# Patient Record
Sex: MALE
[De-identification: names, ages, dates, MRNs, and addresses within clinical notes are randomized per-mention and may not be internally consistent; named-entity substitution may affect disease eponyms.]

---

## 2019-06-14 VITALS — SYSTOLIC BLOOD PRESSURE: 137 MMHG | DIASTOLIC BLOOD PRESSURE: 67 MMHG

## 2019-06-14 LAB
APTT PPP: 28.2 SEC (ref 26.3–35.5)
BASOPHILS NFR BLD AUTO: 1.8 % (ref 0–5)
BUN SERPL-MCNC: 23 MG/DL (ref 7–18)
CHLORIDE SERPL-SCNC: 100 MMOL/L (ref 101–111)
CO2 SERPL-SCNC: 28 MMOL/L (ref 21–32)
CREAT SERPL-MCNC: 1.5 MG/DL (ref 0.5–1.5)
EOSINOPHIL NFR BLD AUTO: 8 % (ref 0–8)
ERYTHROCYTE [DISTWIDTH] IN BLOOD BY AUTOMATED COUNT: 13.6 % (ref 11–15.5)
GFR SERPL CREATININE-BSD FRML MDRD: 49 ML/MIN (ref 60–?)
GLUCOSE SERPL-MCNC: 209 MG/DL (ref 70–105)
HCT VFR BLD AUTO: 39.6 % (ref 42–54)
INR PPP: 1.03 (ref 0.85–1.15)
LYMPHOCYTES NFR SPEC AUTO: 27.1 % (ref 21–51)
MCH RBC QN AUTO: 30.6 PG (ref 27–33)
MCHC RBC AUTO-ENTMCNC: 34.7 G/DL (ref 32–36)
MCV RBC AUTO: 88.1 FL (ref 79–99)
MONOCYTES NFR BLD AUTO: 8.1 % (ref 3–13)
NEUTROPHILS NFR BLD AUTO: 55 % (ref 40–77)
NRBC BLD MANUAL-RTO: 0.1 % (ref 0–0.19)
PH UR STRIP: 5 [PH] (ref 5–8)
PLATELET # BLD AUTO: 221 K/UL (ref 130–400)
POTASSIUM SERPL-SCNC: 5.3 MMOL/L (ref 3.5–5.1)
PROTHROMBIN TIME: 10.8 SEC (ref 9.6–11.6)
RBC # BLD AUTO: 4.5 MIL/UL (ref 4.5–6.2)
SODIUM SERPL-SCNC: 136 MMOL/L (ref 136–145)
SP GR UR STRIP: 1.01 (ref 1–1.03)
UROBILINOGEN UR STRIP-MCNC: 0.2 MG/DL (ref 0.2–1)
WBC # BLD AUTO: 6.6 K/UL (ref 4.8–10.8)

## 2019-06-17 ENCOUNTER — HOSPITAL ENCOUNTER (OUTPATIENT)
Dept: HOSPITAL 90 - DAH | Age: 74
Discharge: HOME | End: 2019-06-17
Attending: INTERNAL MEDICINE
Payer: COMMERCIAL

## 2019-06-17 VITALS — DIASTOLIC BLOOD PRESSURE: 75 MMHG | SYSTOLIC BLOOD PRESSURE: 137 MMHG

## 2019-06-17 VITALS — SYSTOLIC BLOOD PRESSURE: 146 MMHG | DIASTOLIC BLOOD PRESSURE: 67 MMHG

## 2019-06-17 VITALS — SYSTOLIC BLOOD PRESSURE: 145 MMHG | DIASTOLIC BLOOD PRESSURE: 67 MMHG

## 2019-06-17 VITALS — SYSTOLIC BLOOD PRESSURE: 132 MMHG | DIASTOLIC BLOOD PRESSURE: 63 MMHG

## 2019-06-17 VITALS — BODY MASS INDEX: 25.76 KG/M2 | WEIGHT: 154.6 LBS | HEIGHT: 65 IN

## 2019-06-17 VITALS — SYSTOLIC BLOOD PRESSURE: 121 MMHG | DIASTOLIC BLOOD PRESSURE: 76 MMHG

## 2019-06-17 VITALS — SYSTOLIC BLOOD PRESSURE: 139 MMHG | DIASTOLIC BLOOD PRESSURE: 70 MMHG

## 2019-06-17 VITALS — DIASTOLIC BLOOD PRESSURE: 66 MMHG | SYSTOLIC BLOOD PRESSURE: 125 MMHG

## 2019-06-17 VITALS — DIASTOLIC BLOOD PRESSURE: 73 MMHG | SYSTOLIC BLOOD PRESSURE: 133 MMHG

## 2019-06-17 VITALS — SYSTOLIC BLOOD PRESSURE: 110 MMHG | DIASTOLIC BLOOD PRESSURE: 77 MMHG

## 2019-06-17 VITALS — DIASTOLIC BLOOD PRESSURE: 63 MMHG | SYSTOLIC BLOOD PRESSURE: 123 MMHG

## 2019-06-17 VITALS — DIASTOLIC BLOOD PRESSURE: 57 MMHG | SYSTOLIC BLOOD PRESSURE: 125 MMHG

## 2019-06-17 DIAGNOSIS — E11.21: ICD-10-CM

## 2019-06-17 DIAGNOSIS — N18.9: ICD-10-CM

## 2019-06-17 DIAGNOSIS — I25.2: ICD-10-CM

## 2019-06-17 DIAGNOSIS — Z79.4: ICD-10-CM

## 2019-06-17 DIAGNOSIS — I25.10: Primary | ICD-10-CM

## 2019-06-17 DIAGNOSIS — R00.2: ICD-10-CM

## 2019-06-17 DIAGNOSIS — Z87.891: ICD-10-CM

## 2019-06-17 DIAGNOSIS — I13.0: ICD-10-CM

## 2019-06-17 DIAGNOSIS — Z79.84: ICD-10-CM

## 2019-06-17 DIAGNOSIS — Z82.49: ICD-10-CM

## 2019-06-17 DIAGNOSIS — I50.22: ICD-10-CM

## 2019-06-17 DIAGNOSIS — Z83.3: ICD-10-CM

## 2019-06-17 DIAGNOSIS — E11.22: ICD-10-CM

## 2019-06-17 DIAGNOSIS — Z98.890: ICD-10-CM

## 2019-06-17 DIAGNOSIS — Z79.899: ICD-10-CM

## 2019-06-17 LAB
BUN SERPL-MCNC: 22 MG/DL (ref 7–18)
CHLORIDE SERPL-SCNC: 101 MMOL/L (ref 101–111)
CO2 SERPL-SCNC: 27 MMOL/L (ref 21–32)
CREAT SERPL-MCNC: 1.5 MG/DL (ref 0.5–1.5)
GFR SERPL CREATININE-BSD FRML MDRD: 49 ML/MIN (ref 60–?)
GLUCOSE SERPL-MCNC: 122 MG/DL (ref 70–105)
POTASSIUM SERPL-SCNC: 4.4 MMOL/L (ref 3.5–5.1)
SODIUM SERPL-SCNC: 136 MMOL/L (ref 136–145)

## 2019-06-17 PROCEDURE — 93458 L HRT ARTERY/VENTRICLE ANGIO: CPT

## 2019-06-17 PROCEDURE — 81003 URINALYSIS AUTO W/O SCOPE: CPT

## 2019-06-17 PROCEDURE — 99157 MOD SED OTHER PHYS/QHP EA: CPT

## 2019-06-17 PROCEDURE — 82948 REAGENT STRIP/BLOOD GLUCOSE: CPT

## 2019-06-17 PROCEDURE — 36415 COLL VENOUS BLD VENIPUNCTURE: CPT

## 2019-06-17 PROCEDURE — 71045 X-RAY EXAM CHEST 1 VIEW: CPT

## 2019-06-17 PROCEDURE — 93005 ELECTROCARDIOGRAM TRACING: CPT

## 2019-06-17 PROCEDURE — 85025 COMPLETE CBC W/AUTO DIFF WBC: CPT

## 2019-06-17 PROCEDURE — 85730 THROMBOPLASTIN TIME PARTIAL: CPT

## 2019-06-17 PROCEDURE — 85610 PROTHROMBIN TIME: CPT

## 2019-06-17 PROCEDURE — 80048 BASIC METABOLIC PNL TOTAL CA: CPT

## 2019-06-17 PROCEDURE — 99156 MOD SED OTH PHYS/QHP 5/>YRS: CPT

## 2019-06-17 NOTE — NUR
DISCHARGE



DAY PT DISCHARGE INSTRUCTION SHEET, MED REC, AND PT EDUCATION REVIEWED WITH PT AND 
DAUGHTERS. EDUCATED ON CHECKING CATH SITE FOR SIGNS OF BLEEDING INCLUDING EXCESSIVE 
SWELLING/PAIN, CHANGE IN COLOR TO AFFECTED LEG. INSTRUCTED TO APPLY DIRECT PRESSURE IF 
PUNCTURE SITE STARTS BLEEDING AND CALL 911. PT AND FAMILY VERBALIZED UNDERSTANDING.

## 2019-06-17 NOTE — NUR
MD ROUNDS



DR. العلي IN TO SEE PATIENT. FINDINGS AND PLAN DISCUSSED WITH PT AND FAMILY. PT STATING "I 
DONT WANT SURGERY". PER DR. العلي NOTIFY ALONZO BRICE PT NOT IN AGREEMENT WITH SURGERY.

## 2019-06-17 NOTE — NUR
ACTIVITY



HOB ELEVATED TO 25 DEGREES. RIGHT FEMORAL CATH SITE W/O SIGNS OF BLEEDING. DRESSING CLEAN, 
DRY, AND INTACT. SITE SOFT, NON-TENDER.

## 2019-06-17 NOTE — NUR
REMOVED 1 ML  OF AIR REMOVED FROM RIGHT RADIAL BAND. NO SIGNS OF BLEEDING OR HEMATOMA NOTED. 
PATIENT STATES FEELING FINE.

## 2019-06-17 NOTE — NUR
PRE-PROCEDURE



RECEIVED TO DAY 15 FOR SCHEDULED HEAR CATHETERIZATION RADIAL APPROACH VIA WALKING 
ACCOMPANIED BY DAUGHTER. AWAKE IN NO ACUTE DISTRESS. CONNECTED TO CONTINUOUS CARDIOPULMONARY 
MONITORING. BED IN LOWEST POSITION, CALL LIGHT WITHIN REACH, SIDE RAILS UP X2.

## 2019-06-17 NOTE — NUR
REMOVED 2ML  OF AIR FROM RIGHT RADIAL BAND. NO SIGNS OF BLEEDING OR HEMATOMA NOTED. PATIENT 
STATES FEELING FINE.

## 2019-06-17 NOTE — NUR
ACTIVITY



UP TO EDGE OF BED WITH MINIMAL ASSIST X1. TOLERATED W/O COMPLAINTS OF DIZZINESS/PAIN.  RIGHT 
FEMORAL CATH SITE W/O SIGNS OF BLEEDING. DRESSING CD&I, SITE SOFT, NON-TENDER. RIGHT RADIAL 
BAND IN PLACE; SITE W/O SIGNS OF BLEEDING.

## 2020-12-11 ENCOUNTER — HOSPITAL ENCOUNTER (INPATIENT)
Dept: HOSPITAL 90 - EDH | Age: 75
LOS: 1 days | DRG: 177 | End: 2020-12-12
Attending: INTERNAL MEDICINE | Admitting: INTERNAL MEDICINE
Payer: COMMERCIAL

## 2020-12-11 VITALS — WEIGHT: 134.99 LBS | HEIGHT: 65 IN | BODY MASS INDEX: 22.49 KG/M2

## 2020-12-11 DIAGNOSIS — Z79.4: ICD-10-CM

## 2020-12-11 DIAGNOSIS — I50.22: ICD-10-CM

## 2020-12-11 DIAGNOSIS — U07.1: Primary | ICD-10-CM

## 2020-12-11 DIAGNOSIS — E78.5: ICD-10-CM

## 2020-12-11 DIAGNOSIS — Z95.1: ICD-10-CM

## 2020-12-11 DIAGNOSIS — E11.22: ICD-10-CM

## 2020-12-11 DIAGNOSIS — J12.89: ICD-10-CM

## 2020-12-11 DIAGNOSIS — I13.0: ICD-10-CM

## 2020-12-11 DIAGNOSIS — J96.01: ICD-10-CM

## 2020-12-11 DIAGNOSIS — I21.4: ICD-10-CM

## 2020-12-11 DIAGNOSIS — I46.9: ICD-10-CM

## 2020-12-11 DIAGNOSIS — N40.0: ICD-10-CM

## 2020-12-11 DIAGNOSIS — E87.1: ICD-10-CM

## 2020-12-11 DIAGNOSIS — I25.10: ICD-10-CM

## 2020-12-11 DIAGNOSIS — N18.30: ICD-10-CM

## 2020-12-11 DIAGNOSIS — I47.1: ICD-10-CM

## 2020-12-11 DIAGNOSIS — Z79.02: ICD-10-CM

## 2020-12-11 DIAGNOSIS — E11.42: ICD-10-CM

## 2020-12-11 DIAGNOSIS — Z88.8: ICD-10-CM

## 2020-12-11 LAB
ALBUMIN SERPL-MCNC: 3.1 G/DL (ref 3.5–5)
ALT SERPL-CCNC: 34 U/L (ref 12–78)
APTT PPP: 26.7 SEC (ref 26.3–35.5)
AST SERPL-CCNC: 111 U/L (ref 10–37)
BASOPHILS NFR BLD AUTO: 0.1 % (ref 0–5)
BILIRUB SERPL-MCNC: 0.4 MG/DL (ref 0.2–1)
BUN SERPL-MCNC: 32 MG/DL (ref 7–18)
CHLORIDE SERPL-SCNC: 97 MMOL/L (ref 101–111)
CK SERPL-CCNC: 1061 U/L (ref 21–232)
CO2 SERPL-SCNC: 21 MMOL/L (ref 21–32)
CREAT SERPL-MCNC: 1.8 MG/DL (ref 0.5–1.5)
EOSINOPHIL NFR BLD AUTO: 0 % (ref 0–8)
ERYTHROCYTE [DISTWIDTH] IN BLOOD BY AUTOMATED COUNT: 12.7 % (ref 11–15.5)
GFR SERPL CREATININE-BSD FRML MDRD: 39 ML/MIN (ref 60–?)
GLUCOSE SERPL-MCNC: 235 MG/DL (ref 70–105)
GLUCOSE UR STRIP-MCNC: 500 MG/DL
HCT VFR BLD AUTO: 34.4 % (ref 42–54)
INR PPP: 1.05 (ref 0.85–1.15)
LYMPHOCYTES NFR SPEC AUTO: 5.6 % (ref 21–51)
MCH RBC QN AUTO: 29.7 PG (ref 27–33)
MCHC RBC AUTO-ENTMCNC: 34 G/DL (ref 32–36)
MCV RBC AUTO: 87.3 FL (ref 79–99)
MONOCYTES NFR BLD AUTO: 6.1 % (ref 3–13)
MYOGLOBIN SERPL-MCNC: 971 NG/ML (ref 10–92)
NEUTROPHILS NFR BLD AUTO: 88 % (ref 40–77)
NRBC BLD MANUAL-RTO: 0 % (ref 0–0.19)
PH UR STRIP: 5 [PH] (ref 5–8)
PLATELET # BLD AUTO: 166 K/UL (ref 130–400)
POTASSIUM SERPL-SCNC: 4.2 MMOL/L (ref 3.5–5.1)
PROT SERPL-MCNC: 6.7 G/DL (ref 6–8.3)
PROT UR QL STRIP: (no result) MG/DL
PROTHROMBIN TIME: 11.3 SEC (ref 9.6–11.6)
RBC # BLD AUTO: 3.94 MIL/UL (ref 4.5–6.2)
RBC #/AREA URNS HPF: (no result) /HPF (ref 0–1)
SODIUM SERPL-SCNC: 129 MMOL/L (ref 136–145)
SP GR UR STRIP: 1.03 (ref 1–1.03)
TROPONIN I SERPL-MCNC: 19.35 NG/ML (ref 0–0.06)
UROBILINOGEN UR STRIP-MCNC: 1 MG/DL (ref 0.2–1)
WBC # BLD AUTO: 8.2 K/UL (ref 4.8–10.8)
WBC #/AREA URNS HPF: (no result) /HPF (ref 0–1)

## 2020-12-11 PROCEDURE — 83874 ASSAY OF MYOGLOBIN: CPT

## 2020-12-11 PROCEDURE — 80048 BASIC METABOLIC PNL TOTAL CA: CPT

## 2020-12-11 PROCEDURE — 84145 PROCALCITONIN (PCT): CPT

## 2020-12-11 PROCEDURE — 83735 ASSAY OF MAGNESIUM: CPT

## 2020-12-11 PROCEDURE — 85025 COMPLETE CBC W/AUTO DIFF WBC: CPT

## 2020-12-11 PROCEDURE — 82728 ASSAY OF FERRITIN: CPT

## 2020-12-11 PROCEDURE — 84100 ASSAY OF PHOSPHORUS: CPT

## 2020-12-11 PROCEDURE — 93970 EXTREMITY STUDY: CPT

## 2020-12-11 PROCEDURE — 83605 ASSAY OF LACTIC ACID: CPT

## 2020-12-11 PROCEDURE — 81001 URINALYSIS AUTO W/SCOPE: CPT

## 2020-12-11 PROCEDURE — 86901 BLOOD TYPING SEROLOGIC RH(D): CPT

## 2020-12-11 PROCEDURE — 85610 PROTHROMBIN TIME: CPT

## 2020-12-11 PROCEDURE — 80053 COMPREHEN METABOLIC PANEL: CPT

## 2020-12-11 PROCEDURE — 82803 BLOOD GASES ANY COMBINATION: CPT

## 2020-12-11 PROCEDURE — 92950 HEART/LUNG RESUSCITATION CPR: CPT

## 2020-12-11 PROCEDURE — 71045 X-RAY EXAM CHEST 1 VIEW: CPT

## 2020-12-11 PROCEDURE — 83615 LACTATE (LD) (LDH) ENZYME: CPT

## 2020-12-11 PROCEDURE — 86850 RBC ANTIBODY SCREEN: CPT

## 2020-12-11 PROCEDURE — 36415 COLL VENOUS BLD VENIPUNCTURE: CPT

## 2020-12-11 PROCEDURE — 80076 HEPATIC FUNCTION PANEL: CPT

## 2020-12-11 PROCEDURE — 84484 ASSAY OF TROPONIN QUANT: CPT

## 2020-12-11 PROCEDURE — 86900 BLOOD TYPING SEROLOGIC ABO: CPT

## 2020-12-11 PROCEDURE — 36600 WITHDRAWAL OF ARTERIAL BLOOD: CPT

## 2020-12-11 PROCEDURE — 82550 ASSAY OF CK (CPK): CPT

## 2020-12-11 PROCEDURE — 86140 C-REACTIVE PROTEIN: CPT

## 2020-12-11 PROCEDURE — 85730 THROMBOPLASTIN TIME PARTIAL: CPT

## 2020-12-11 PROCEDURE — 87088 URINE BACTERIA CULTURE: CPT

## 2020-12-11 PROCEDURE — 87040 BLOOD CULTURE FOR BACTERIA: CPT

## 2020-12-11 PROCEDURE — 93005 ELECTROCARDIOGRAM TRACING: CPT

## 2020-12-11 PROCEDURE — 85378 FIBRIN DEGRADE SEMIQUANT: CPT

## 2020-12-12 LAB
ALBUMIN SERPL-MCNC: 2.8 G/DL (ref 3.5–5)
ALT SERPL-CCNC: 37 U/L (ref 12–78)
AST SERPL-CCNC: 117 U/L (ref 10–37)
BASE EXCESS BLDA CALC-SCNC: -4.3 MMOL/L (ref -2–3)
BASOPHILS NFR BLD AUTO: 0.1 % (ref 0–5)
BILIRUB DIRECT SERPL-MCNC: 0.1 MG/DL (ref 0–0.3)
BILIRUB SERPL-MCNC: 0.4 MG/DL (ref 0.2–1)
BUN SERPL-MCNC: 38 MG/DL (ref 7–18)
CHLORIDE SERPL-SCNC: 101 MMOL/L (ref 101–111)
CO2 SERPL-SCNC: 21 MMOL/L (ref 21–32)
CREAT SERPL-MCNC: 1.6 MG/DL (ref 0.5–1.5)
CRP SERPL HS-MCNC: 67.7 MG/L (ref 0–9)
EOSINOPHIL NFR BLD AUTO: 0 % (ref 0–8)
ERYTHROCYTE [DISTWIDTH] IN BLOOD BY AUTOMATED COUNT: 12.8 % (ref 11–15.5)
GFR SERPL CREATININE-BSD FRML MDRD: 45 ML/MIN (ref 60–?)
GLUCOSE SERPL-MCNC: 189 MG/DL (ref 70–105)
HCO3 BLDA-SCNC: 19.2 MMOL/L (ref 21–28)
HCT VFR BLD AUTO: 37.5 % (ref 42–54)
LDH SERPL-CCNC: 529 U/L (ref 81–234)
LYMPHOCYTES NFR SPEC AUTO: 6.1 % (ref 21–51)
MAGNESIUM SERPL-MCNC: 1.9 MG/DL (ref 1.8–2.4)
MCH RBC QN AUTO: 29.1 PG (ref 27–33)
MCHC RBC AUTO-ENTMCNC: 33.3 G/DL (ref 32–36)
MCV RBC AUTO: 87.4 FL (ref 79–99)
MONOCYTES NFR BLD AUTO: 5.1 % (ref 3–13)
NEUTROPHILS NFR BLD AUTO: 88.2 % (ref 40–77)
NRBC BLD MANUAL-RTO: 0 % (ref 0–0.19)
PCO2 BLDA: 31 MMHG (ref 35–48)
PHOSPHATE SERPL-MCNC: 3.1 MG/DL (ref 2.5–4.9)
PLATELET # BLD AUTO: 166 K/UL (ref 130–400)
POTASSIUM SERPL-SCNC: 5.1 MMOL/L (ref 3.5–5.1)
PROT SERPL-MCNC: 5.8 G/DL (ref 6–8.3)
RBC # BLD AUTO: 4.29 MIL/UL (ref 4.5–6.2)
SAO2 % BLDA: 88.9 % (ref 95–99)
SODIUM SERPL-SCNC: 135 MMOL/L (ref 136–145)
WBC # BLD AUTO: 8.8 K/UL (ref 4.8–10.8)

## 2020-12-12 PROCEDURE — 5A0935A ASSISTANCE WITH RESPIRATORY VENTILATION, LESS THAN 24 CONSECUTIVE HOURS, HIGH NASAL FLOW/VELOCITY: ICD-10-PCS | Performed by: INTERNAL MEDICINE

## 2020-12-12 PROCEDURE — 5A12012 PERFORMANCE OF CARDIAC OUTPUT, SINGLE, MANUAL: ICD-10-PCS
